# Patient Record
Sex: FEMALE | Race: WHITE | Employment: UNEMPLOYED | URBAN - NONMETROPOLITAN AREA
[De-identification: names, ages, dates, MRNs, and addresses within clinical notes are randomized per-mention and may not be internally consistent; named-entity substitution may affect disease eponyms.]

---

## 2018-01-30 ENCOUNTER — OFFICE VISIT (OUTPATIENT)
Dept: FAMILY MEDICINE CLINIC | Age: 53
End: 2018-01-30
Payer: COMMERCIAL

## 2018-01-30 VITALS
SYSTOLIC BLOOD PRESSURE: 120 MMHG | HEIGHT: 63 IN | WEIGHT: 153 LBS | BODY MASS INDEX: 27.11 KG/M2 | DIASTOLIC BLOOD PRESSURE: 70 MMHG | TEMPERATURE: 100.5 F

## 2018-01-30 DIAGNOSIS — J06.9 UPPER RESPIRATORY TRACT INFECTION, UNSPECIFIED TYPE: Primary | ICD-10-CM

## 2018-01-30 PROCEDURE — G8427 DOCREV CUR MEDS BY ELIG CLIN: HCPCS | Performed by: FAMILY MEDICINE

## 2018-01-30 PROCEDURE — 99213 OFFICE O/P EST LOW 20 MIN: CPT | Performed by: FAMILY MEDICINE

## 2018-01-30 PROCEDURE — 1036F TOBACCO NON-USER: CPT | Performed by: FAMILY MEDICINE

## 2018-01-30 PROCEDURE — G8419 CALC BMI OUT NRM PARAM NOF/U: HCPCS | Performed by: FAMILY MEDICINE

## 2018-01-30 PROCEDURE — 3014F SCREEN MAMMO DOC REV: CPT | Performed by: FAMILY MEDICINE

## 2018-01-30 PROCEDURE — G8484 FLU IMMUNIZE NO ADMIN: HCPCS | Performed by: FAMILY MEDICINE

## 2018-01-30 PROCEDURE — 3017F COLORECTAL CA SCREEN DOC REV: CPT | Performed by: FAMILY MEDICINE

## 2018-01-30 RX ORDER — PROMETHAZINE HYDROCHLORIDE AND CODEINE PHOSPHATE 6.25; 1 MG/5ML; MG/5ML
SYRUP ORAL
Qty: 100 ML | Refills: 0 | Status: SHIPPED | OUTPATIENT
Start: 2018-01-30 | End: 2018-02-09

## 2018-01-30 RX ORDER — AZITHROMYCIN 250 MG/1
TABLET, FILM COATED ORAL
Qty: 1 PACKET | Refills: 0 | Status: SHIPPED | OUTPATIENT
Start: 2018-01-30 | End: 2018-02-09

## 2018-01-30 ASSESSMENT — PATIENT HEALTH QUESTIONNAIRE - PHQ9
SUM OF ALL RESPONSES TO PHQ9 QUESTIONS 1 & 2: 0
1. LITTLE INTEREST OR PLEASURE IN DOING THINGS: 0
2. FEELING DOWN, DEPRESSED OR HOPELESS: 0
SUM OF ALL RESPONSES TO PHQ QUESTIONS 1-9: 0

## 2018-01-30 NOTE — PROGRESS NOTES
[address]  [place] [id number]  Dept: [phone]      Venita Gilbert is a 46 y.o. female who presents today for   Chief Complaint   Patient presents with    Pharyngitis    Cough       HPI  Respiratory Symptoms:  Venita Gilbert complains of 1 day(s) history of sore throat, congestion, post nasal drip, coryza, non productive cough, chills, headache and ear pain Pt denies fever, shortness of breath and wheezing/chest tightness. Smoking history:  She  reports that she has never smoked. She has never used smokeless tobacco. Treatment to date: advil, nyquil. Current Outpatient Prescriptions   Medication Sig Dispense Refill    azithromycin (ZITHROMAX) 250 MG tablet Take 2 tabs (500 mg) on Day 1, and take 1 tab (250 mg) on days 2 through 5. 1 packet 0    promethazine-codeine (PHENERGAN WITH CODEINE) 6.25-10 MG/5ML syrup 1-2 tsp po q6h prn. 100 mL 0     No current facility-administered medications for this visit. ROS:  General Constitutional: Admits chills. Denies fever. Admits headache. Denies lightheadedness. Ophthalmologic: Denies blurred vision. ENT: Admits nasal congestion post nasal drip, admits sore throat. Admits ear pain and pressure. Respiratory: Admits dry cough, worse at night. Denies shortness of breath. Denies wheezing. Past Surgical History:   Procedure Laterality Date     SECTION         No family history on file. No past medical history on file. Social History   Substance Use Topics    Smoking status: Never Smoker    Smokeless tobacco: Never Used    Alcohol use Not on file      Current Outpatient Prescriptions   Medication Sig Dispense Refill    azithromycin (ZITHROMAX) 250 MG tablet Take 2 tabs (500 mg) on Day 1, and take 1 tab (250 mg) on days 2 through 5. 1 packet 0    promethazine-codeine (PHENERGAN WITH CODEINE) 6.25-10 MG/5ML syrup 1-2 tsp po q6h prn.  100 mL 0     No current

## 2019-11-07 ENCOUNTER — HOSPITAL ENCOUNTER (OUTPATIENT)
Dept: WOMENS IMAGING | Age: 54
Discharge: HOME | End: 2019-11-09
Payer: COMMERCIAL

## 2019-11-07 DIAGNOSIS — Z12.31 BREAST CANCER SCREENING BY MAMMOGRAM: ICD-10-CM

## 2019-11-07 PROCEDURE — 77063 BREAST TOMOSYNTHESIS BI: CPT

## 2020-07-27 ENCOUNTER — OFFICE VISIT (OUTPATIENT)
Dept: FAMILY MEDICINE CLINIC | Age: 55
End: 2020-07-27
Payer: COMMERCIAL

## 2020-07-27 ENCOUNTER — TELEPHONE (OUTPATIENT)
Dept: GASTROENTEROLOGY | Facility: CLINIC | Age: 55
End: 2020-07-27

## 2020-07-27 VITALS
DIASTOLIC BLOOD PRESSURE: 80 MMHG | HEIGHT: 62 IN | WEIGHT: 149.6 LBS | BODY MASS INDEX: 27.53 KG/M2 | SYSTOLIC BLOOD PRESSURE: 112 MMHG | HEART RATE: 68 BPM

## 2020-07-27 PROCEDURE — 99396 PREV VISIT EST AGE 40-64: CPT | Performed by: NURSE PRACTITIONER

## 2020-07-27 ASSESSMENT — ENCOUNTER SYMPTOMS
DIARRHEA: 0
CONSTIPATION: 0

## 2020-07-27 NOTE — PROGRESS NOTES
Name: Radha Hernandez  : 1965         Chief Complaint:     Chief Complaint   Patient presents with    Annual Exam       History of Present Illness:      Radha Hernandez is a 54 y.o.  female who presents with Annual Exam      HPI     The patient presents for an annual exam. She has no concerns today and states that she is doing well. She states that she has a history of loose stools which have since been resolved by taking probiotics and by dietary modification. She has decreased the size of her portions, decreased the amount of fried foods, such as onion rings, and has eaten more salads which have all helped to improve her symptoms. She reports that she used to use [employer] in the past, and reports that she is aware that she needs to eat healthier. For exercise, she walks 2 miles, three times per week and participates in housework and yardwork. Past Medical History:     History reviewed. No pertinent past medical history. Reviewed all health maintenance requirements and ordered appropriate tests  Health Maintenance Due   Topic Date Due    Hepatitis C screen  1965    HIV screen  1980    DTaP/Tdap/Td vaccine (1 - Tdap) 1984    Cervical cancer screen  1986    Lipid screen  2005    Diabetes screen  2005    Shingles Vaccine (1 of 2) 2015    Colon cancer screen colonoscopy  2015       Past Surgical History:     Past Surgical History:   Procedure Laterality Date     SECTION          Medications:       Prior to Admission medications    Medication Sig Start Date End Date Taking? Authorizing Provider   BIOTIN PO Take by mouth daily   Yes Historical Provider, MD   CRANBERRY PO Take by mouth   Yes Historical Provider, MD   Probiotic Product (PROBIOTIC PO) Take by mouth   Yes Historical Provider, MD        Allergies:       Patient has no known allergies. Social History:     Tobacco:    reports that she has never smoked.  She has never used smokeless tobacco.  Alcohol:      reports current alcohol use. Drug Use:  reports no history of drug use. Family History:     History reviewed. No pertinent family history. Review of Systems:     Positive and Negative as described in HPI    Review of Systems   Gastrointestinal: Negative for constipation and diarrhea. Physical Exam:   Vitals:  /80 (Site: Left Upper Arm, Position: Sitting)   Pulse 68   Ht 5' 2.25\" (1.581 m)   Wt 149 lb 9.6 oz (67.9 kg)   BMI 27.14 kg/m²     Physical Exam  Constitutional:       Appearance: Normal appearance. HENT:      Head: Normocephalic. Right Ear: Tympanic membrane, ear canal and external ear normal.      Left Ear: Tympanic membrane, ear canal and external ear normal.      Nose: Nose normal. No congestion or rhinorrhea. Mouth/Throat:      Mouth: Mucous membranes are moist.      Pharynx: No oropharyngeal exudate or posterior oropharyngeal erythema. Eyes:      Extraocular Movements: Extraocular movements intact. Conjunctiva/sclera: Conjunctivae normal.      Pupils: Pupils are equal, round, and reactive to light. Neck:      Musculoskeletal: Neck supple. No neck rigidity. Cardiovascular:      Rate and Rhythm: Normal rate and regular rhythm. Pulses: Normal pulses. Heart sounds: No murmur. Pulmonary:      Effort: Pulmonary effort is normal. No respiratory distress. Breath sounds: Normal breath sounds. No wheezing. Abdominal:      General: Abdomen is flat. Bowel sounds are normal. There is no distension. Palpations: Abdomen is soft. There is no mass. Musculoskeletal:      Comments: 5/5 strength upper and lower extremities    Skin:     General: Skin is warm and dry. Neurological:      Mental Status: She is alert and oriented to person, place, and time. Psychiatric:         Mood and Affect: Mood normal.         Behavior: Behavior normal.         Thought Content:  Thought content normal. Judgment: Judgment normal.         Data:     No results found for: NA, K, CL, CO2, BUN, CREATININE, GLUCOSE, PROT, LABALBU, BILITOT, ALKPHOS, AST, ALT  No results found for: WBC, RBC, HGB, HCT, MCV, MCH, MCHC, RDW, PLT, MPV  No results found for: TSH  No results found for: CHOL, HDL, PSA, LABA1C    Assessment/Plan:      Diagnosis Orders   1. Routine general medical examination at health care facility  Comprehensive Metabolic Panel    CBC With Auto Differential    Hemoglobin A1C    Lipid Panel     Wellness Exam:   - Health counseling was provided on proper diet and exercise. Patient's BMI was discussed. - Patient is due for her shingles and tdap vaccine. She was encouraged to complete these at the health department. - Patient is due for HIV screen, she denies at this time. - Discussed the difference between the various screening modalities for colorectal cancer. Patient opted for a colonoscopy and was referred to Dr. Jean Marie Mcguire today. - Patient is due for her cervical cancer screen. She will follow up with gynecology for pap. - Mammogram UTD, normal findings 11/2019.   - Patient is due for lipid and diabetes screening. She will complete labs prior to her next appointment. Completed Refills   Requested Prescriptions      No prescriptions requested or ordered in this encounter       Orders Placed This Encounter   Procedures    Comprehensive Metabolic Panel     Standing Status:   Future     Standing Expiration Date:   7/27/2021    CBC With Auto Differential     Standing Status:   Future     Standing Expiration Date:   7/27/2021    Hemoglobin A1C     Standing Status:   Future     Standing Expiration Date:   7/27/2021    Lipid Panel     Standing Status:   Future     Standing Expiration Date:   7/27/2021     Order Specific Question:   Is Patient Fasting?/# of Hours     Answer:   fasting        No results found for this visit on 07/27/20.     Return in about 6 months (around 1/27/2021), or if symptoms worsen or fail to improve.     Electronically signed by ANIVAL Huertas CNP on 07/27/20 at 5:18 PM.

## 2020-07-27 NOTE — TELEPHONE ENCOUNTER
Direct Endoscopy Referral       Fax to [phone] or call the [facility]ier [address] at [address] [facility]  1965  [phone] There is no work phone number on file. [address]    Referring Provider: Katt Mcgee MD   [address] [facility] [address] [phone]  Special Requests: PLEASE CALL AND SCHEDULE PATIENT    Check Requested Procedures:    [x]  Colonoscopy (Please check test type and diagnosis below)    [x]  CPT Code:  Screening Average Risk     []  CPT Code:  Screening High Risk                 []  CPT Code :  Diagnostic Colonoscopy 20240    []  Personal History of colon cancer (Date of surgery)               []  Personal History of colon polyps (Date of surgery)     []  Family history of colon cancer ([facility])     []  Family history of colon polyps (1st degree relative - Who)     []  Abnormal barium enema or CT (Please attach report)    []  Change in bowel habits     []  Occult GI bleeding     []  Melena with negative EGD     []  Iron deficiency anemia    []  Other:        []  EGD (Please check test type and diagnosis below)     []  CPT Code: EGD 66766    []  Melena     []  Dyspepsia/GERD     []  Dysphagia     []  Epigastric pain unresponsive to treatment     []  Iron deficiency anemia with negative colonoscopy     []  Occult GI bleeding with negative colonoscopy     []  Abnormal UGI, x-ray, or CT (attach report)    [] Other:    HISTORY:  No past medical history on file. Patient has no known allergies. Prior to Visit Medications    Medication Sig Taking?  Authorizing Provider   NONFORMULARY   Jany Palencia MD         Electronically signed by Katt Mcgee MD on 7/27/20 at 3:45 PM EDT

## 2020-07-27 NOTE — PATIENT INSTRUCTIONS
SURVEY:    You may be receiving a survey from [employer] regarding your visit today. Please complete the survey to enable us to provide the highest quality of care to you and your family. If you are unable to score a \"very good' on any question, please call the office to discuss how we can improve your experience. We appreciate your feedback. Thank you! Plan:   Wellness Exam:   - Health counseling was provided on proper diet and exercise. Patient's BMI was discussed. - Patient is due for her shingles and tdap vaccine. She was encouraged to complete these at the health department. - Patient is due for HIV screen, she denies at this time. - Discussed the difference between the various screening modalities for colorectal cancer. Patient opted for a colonoscopy and was referred to Dr. Lisandro Ugalde today. - Patient is due for her cervical cancer screen. She will follow up with gynecology for pap. - Mammogram UTD, normal findings 11/2019.   - Patient is due for lipid and diabetes screening. She will complete labs prior to her next appointment.

## 2020-07-31 ENCOUNTER — HOSPITAL ENCOUNTER (OUTPATIENT)
Dept: LAB | Age: 55
Setting detail: SPECIMEN
Discharge: HOME | End: 2020-07-31
Payer: COMMERCIAL

## 2020-07-31 ENCOUNTER — TELEPHONE (OUTPATIENT)
Dept: FAMILY MEDICINE CLINIC | Age: 55
End: 2020-07-31

## 2020-07-31 PROBLEM — Z12.11 COLON CANCER SCREENING: Status: ACTIVE | Noted: 2020-07-31

## 2020-07-31 PROCEDURE — U0003 INFECTIOUS AGENT DETECTION BY NUCLEIC ACID (DNA OR RNA); SEVERE ACUTE RESPIRATORY SYNDROME CORONAVIRUS 2 (SARS-COV-2) (CORONAVIRUS DISEASE [COVID-19]), AMPLIFIED PROBE TECHNIQUE, MAKING USE OF HIGH THROUGHPUT TECHNOLOGIES AS DESCRIBED BY CMS-2020-01-R: HCPCS

## 2020-07-31 RX ORDER — SODIUM, POTASSIUM,MAG SULFATES 17.5-3.13G
SOLUTION, RECONSTITUTED, ORAL ORAL
Qty: 2 BOTTLE | Refills: 0 | Status: SHIPPED | OUTPATIENT
Start: 2020-07-31 | End: 2020-11-10 | Stop reason: ALTCHOICE

## 2020-08-03 LAB — SARS-COV-2, NAA: NOT DETECTED

## 2020-08-06 ENCOUNTER — HOSPITAL ENCOUNTER (OUTPATIENT)
Age: 55
Setting detail: SPECIMEN
Discharge: HOME | End: 2020-08-06
Payer: COMMERCIAL

## 2020-08-06 LAB
ABSOLUTE EOS #: 0.09 K/UL (ref 0–0.44)
ABSOLUTE IMMATURE GRANULOCYTE: <0.03 K/UL (ref 0–0.3)
ABSOLUTE LYMPH #: 1.35 K/UL (ref 1.1–3.7)
ABSOLUTE MONO #: 0.45 K/UL (ref 0.1–1.2)
ALBUMIN SERPL-MCNC: 3.9 G/DL (ref 3.5–5.2)
ALBUMIN/GLOBULIN RATIO: 1.4 (ref 1–2.5)
ALP BLD-CCNC: 68 U/L (ref 35–104)
ALT SERPL-CCNC: 19 U/L (ref 5–33)
ANION GAP SERPL CALCULATED.3IONS-SCNC: 13 MMOL/L (ref 9–17)
AST SERPL-CCNC: 27 U/L
BASOPHILS # BLD: 1 % (ref 0–2)
BASOPHILS ABSOLUTE: 0.03 K/UL (ref 0–0.2)
BILIRUB SERPL-MCNC: 0.28 MG/DL (ref 0.3–1.2)
BUN BLDV-MCNC: 11 MG/DL (ref 6–20)
BUN/CREAT BLD: ABNORMAL (ref 9–20)
CALCIUM SERPL-MCNC: 9.4 MG/DL (ref 8.6–10.4)
CHLORIDE BLD-SCNC: 102 MMOL/L (ref 98–107)
CHOLESTEROL, FASTING: 244 MG/DL
CHOLESTEROL/HDL RATIO: 3.3
CO2: 24 MMOL/L (ref 20–31)
CREAT SERPL-MCNC: 0.77 MG/DL (ref 0.5–0.9)
DIFFERENTIAL TYPE: NORMAL
EOSINOPHILS RELATIVE PERCENT: 2 % (ref 1–4)
GFR AFRICAN AMERICAN: >60 ML/MIN
GFR NON-AFRICAN AMERICAN: >60 ML/MIN
GFR SERPL CREATININE-BSD FRML MDRD: ABNORMAL ML/MIN/{1.73_M2}
GFR SERPL CREATININE-BSD FRML MDRD: ABNORMAL ML/MIN/{1.73_M2}
GLUCOSE BLD-MCNC: 72 MG/DL (ref 70–99)
HCT VFR BLD CALC: 46.8 % (ref 36.3–47.1)
HDLC SERPL-MCNC: 74 MG/DL
HEMOGLOBIN: 14.5 G/DL (ref 11.9–15.1)
IMMATURE GRANULOCYTES: 0 %
LDL CHOLESTEROL: 152 MG/DL (ref 0–130)
LYMPHOCYTES # BLD: 27 % (ref 24–43)
MCH RBC QN AUTO: 28.4 PG (ref 25.2–33.5)
MCHC RBC AUTO-ENTMCNC: 31 G/DL (ref 28.4–34.8)
MCV RBC AUTO: 91.6 FL (ref 82.6–102.9)
MONOCYTES # BLD: 9 % (ref 3–12)
NRBC AUTOMATED: 0 PER 100 WBC
PDW BLD-RTO: 13.6 % (ref 11.8–14.4)
PLATELET # BLD: 378 K/UL (ref 138–453)
PLATELET ESTIMATE: NORMAL
PMV BLD AUTO: 12.1 FL (ref 8.1–13.5)
POTASSIUM SERPL-SCNC: 4.4 MMOL/L (ref 3.7–5.3)
RBC # BLD: 5.11 M/UL (ref 3.95–5.11)
RBC # BLD: NORMAL 10*6/UL
SEG NEUTROPHILS: 61 % (ref 36–65)
SEGMENTED NEUTROPHILS ABSOLUTE COUNT: 2.98 K/UL (ref 1.5–8.1)
SODIUM BLD-SCNC: 139 MMOL/L (ref 135–144)
TOTAL PROTEIN: 6.7 G/DL (ref 6.4–8.3)
TRIGLYCERIDE, FASTING: 89 MG/DL
VLDLC SERPL CALC-MCNC: ABNORMAL MG/DL (ref 1–30)
WBC # BLD: 4.9 K/UL (ref 3.5–11.3)
WBC # BLD: NORMAL 10*3/UL

## 2020-08-07 LAB
ESTIMATED AVERAGE GLUCOSE: 120 MG/DL
HBA1C MFR BLD: 5.8 % (ref 4–6)

## 2020-08-30 PROBLEM — Z12.11 COLON CANCER SCREENING: Status: RESOLVED | Noted: 2020-07-31 | Resolved: 2020-08-30

## 2020-09-17 ENCOUNTER — TELEPHONE (OUTPATIENT)
Dept: SURGERY | Age: 55
End: 2020-09-17

## 2020-09-17 NOTE — TELEPHONE ENCOUNTER
Jami Rose called this office stating she would like to schedule a screening colonoscopy. Surgical papers given to Dr. Leonila Chan and Screening colonoscopy was approved and scheduled for 10-. Instruction reviewed and copy mailed to Jami Rose.

## 2020-09-17 NOTE — TELEPHONE ENCOUNTER
Patient notified procedure is cancelled due to Dr. Kody Fiore retiring. Patient would like to follow up with PCP rather than have information sent to general surgery office.

## 2020-10-23 ENCOUNTER — HOSPITAL ENCOUNTER (OUTPATIENT)
Dept: PREADMISSION TESTING | Age: 55
Discharge: HOME | End: 2020-10-27
Payer: COMMERCIAL

## 2020-10-23 LAB
SARS-COV-2, RAPID: NORMAL
SARS-COV-2: NORMAL
SARS-COV-2: NOT DETECTED
SOURCE: NORMAL

## 2020-10-23 PROCEDURE — C9803 HOPD COVID-19 SPEC COLLECT: HCPCS

## 2020-10-23 PROCEDURE — U0003 INFECTIOUS AGENT DETECTION BY NUCLEIC ACID (DNA OR RNA); SEVERE ACUTE RESPIRATORY SYNDROME CORONAVIRUS 2 (SARS-COV-2) (CORONAVIRUS DISEASE [COVID-19]), AMPLIFIED PROBE TECHNIQUE, MAKING USE OF HIGH THROUGHPUT TECHNOLOGIES AS DESCRIBED BY CMS-2020-01-R: HCPCS

## 2020-10-29 ENCOUNTER — ANESTHESIA EVENT (OUTPATIENT)
Dept: OPERATING ROOM | Age: 55
End: 2020-10-29
Payer: COMMERCIAL

## 2020-10-30 ENCOUNTER — HOSPITAL ENCOUNTER (OUTPATIENT)
Age: 55
Setting detail: OUTPATIENT SURGERY
Discharge: HOME | End: 2020-10-30
Attending: SURGERY | Admitting: SURGERY
Payer: COMMERCIAL

## 2020-10-30 ENCOUNTER — ANESTHESIA (OUTPATIENT)
Dept: OPERATING ROOM | Age: 55
End: 2020-10-30
Payer: COMMERCIAL

## 2020-10-30 VITALS
RESPIRATION RATE: 18 BRPM | OXYGEN SATURATION: 98 % | DIASTOLIC BLOOD PRESSURE: 54 MMHG | TEMPERATURE: 97.6 F | SYSTOLIC BLOOD PRESSURE: 134 MMHG | HEART RATE: 64 BPM | HEIGHT: 63 IN | BODY MASS INDEX: 25.69 KG/M2 | WEIGHT: 145 LBS

## 2020-10-30 VITALS
SYSTOLIC BLOOD PRESSURE: 108 MMHG | DIASTOLIC BLOOD PRESSURE: 59 MMHG | RESPIRATION RATE: 17 BRPM | OXYGEN SATURATION: 99 %

## 2020-10-30 PROCEDURE — 7100000011 HC PHASE II RECOVERY - ADDTL 15 MIN: Performed by: SURGERY

## 2020-10-30 PROCEDURE — 7100000010 HC PHASE II RECOVERY - FIRST 15 MIN: Performed by: SURGERY

## 2020-10-30 PROCEDURE — 2709999900 HC NON-CHARGEABLE SUPPLY: Performed by: SURGERY

## 2020-10-30 PROCEDURE — 2500000003 HC RX 250 WO HCPCS: Performed by: NURSE ANESTHETIST, CERTIFIED REGISTERED

## 2020-10-30 PROCEDURE — 88305 TISSUE EXAM BY PATHOLOGIST: CPT

## 2020-10-30 PROCEDURE — 3700000000 HC ANESTHESIA ATTENDED CARE: Performed by: SURGERY

## 2020-10-30 PROCEDURE — 3700000001 HC ADD 15 MINUTES (ANESTHESIA): Performed by: SURGERY

## 2020-10-30 PROCEDURE — 2580000003 HC RX 258: Performed by: SURGERY

## 2020-10-30 PROCEDURE — 6360000002 HC RX W HCPCS: Performed by: NURSE ANESTHETIST, CERTIFIED REGISTERED

## 2020-10-30 PROCEDURE — 3609010600 HC COLONOSCOPY POLYPECTOMY SNARE/COLD BIOPSY: Performed by: SURGERY

## 2020-10-30 PROCEDURE — 45385 COLONOSCOPY W/LESION REMOVAL: CPT | Performed by: SURGERY

## 2020-10-30 RX ORDER — LIDOCAINE HYDROCHLORIDE 20 MG/ML
INJECTION, SOLUTION EPIDURAL; INFILTRATION; INTRACAUDAL; PERINEURAL PRN
Status: DISCONTINUED | OUTPATIENT
Start: 2020-10-30 | End: 2020-10-30 | Stop reason: SDUPTHER

## 2020-10-30 RX ORDER — PROPOFOL 10 MG/ML
INJECTION, EMULSION INTRAVENOUS CONTINUOUS PRN
Status: DISCONTINUED | OUTPATIENT
Start: 2020-10-30 | End: 2020-10-30 | Stop reason: SDUPTHER

## 2020-10-30 RX ORDER — PROPOFOL 10 MG/ML
INJECTION, EMULSION INTRAVENOUS PRN
Status: DISCONTINUED | OUTPATIENT
Start: 2020-10-30 | End: 2020-10-30 | Stop reason: SDUPTHER

## 2020-10-30 RX ORDER — SODIUM CHLORIDE, SODIUM LACTATE, POTASSIUM CHLORIDE, CALCIUM CHLORIDE 600; 310; 30; 20 MG/100ML; MG/100ML; MG/100ML; MG/100ML
INJECTION, SOLUTION INTRAVENOUS CONTINUOUS
Status: DISCONTINUED | OUTPATIENT
Start: 2020-10-30 | End: 2020-10-30 | Stop reason: HOSPADM

## 2020-10-30 RX ADMIN — PROPOFOL 40 MG: 10 INJECTION, EMULSION INTRAVENOUS at 10:45

## 2020-10-30 RX ADMIN — SODIUM CHLORIDE, POTASSIUM CHLORIDE, SODIUM LACTATE AND CALCIUM CHLORIDE: 600; 310; 30; 20 INJECTION, SOLUTION INTRAVENOUS at 09:57

## 2020-10-30 RX ADMIN — LIDOCAINE HYDROCHLORIDE 80 MG: 20 INJECTION, SOLUTION EPIDURAL; INFILTRATION; INTRACAUDAL; PERINEURAL at 10:27

## 2020-10-30 RX ADMIN — PROPOFOL 200 MCG/KG/MIN: 10 INJECTION, EMULSION INTRAVENOUS at 10:28

## 2020-10-30 RX ADMIN — PROPOFOL 100 MG: 10 INJECTION, EMULSION INTRAVENOUS at 10:27

## 2020-10-30 NOTE — PROGRESS NOTES
Discharge instructions given to pt and spouse. Both verbalize understanding,deny any questions and/or concerns. Pt denies wheel chair and ambulates off unit w/ steady gait and all belongings. Discharge Criteria    Inpatients must meet Criteria 1 through 7. All other patients are either YES or N/A. If a NO is chosen then Anesthesia or Surgeon must be notified. 1.  Minimum 30 minutes after last dose of sedative medication, minimum 120 minutes after last dose of reversal agent. Yes      2. Systolic BP stable within 20 mmHg for 30 minutes & systolic BP between 90 & 049 or within 10 mmHg of baseline. Yes      3. Pulse between 60 and 100 or within 10 bpm of baseline. Yes      4. Spontaneous respiratory rate >/= 10 per minute. Yes      5. SaO2 >/= 95 or  >/= baseline. Yes      6. Able to cough and swallow or return to baseline function. Yes      7. Alert and oriented or return to baseline mental status. Yes      8. Demonstrates controlled, coordinated movements, ambulates with steady gait, or return to baseline activity function. Yes      9. Minimal or no pain or nausea, or at a level tolerable and acceptable to patient. Yes      10. Takes and retains oral fluids as allowed. Yes      11. Procedural / perioperative site stable. Minimal or no bleeding. Yes          12. If GI endoscopy procedure, minimal or no abdominal distention or passing flatus. Yes      13. Written discharge instructions and emergency telephone number provided. Yes      14. Accompanied by a responsible adult.     Yes

## 2020-10-30 NOTE — OP NOTE
Operative Note      Patient: Rosa Muse  YOB: 1965  MRN: [id number]   Ashia Brandon MD        Date of Procedure: 10/30/2020     Pre-Op Diagnosis: screening colonoscopy     Post-Op Diagnosis: Same       Procedure(s):  Colonoscopy to cecum with cold snare polypectomy sigmoid polyp x 1     Surgeon(s):  Lilia Tavares DO     Assistant:  * No surgical staff found *     Anesthesia: Monitor Anesthesia Care     Estimated Blood Loss (mL): less than 1 ml     Complications: None     Specimens: sigmoid polyp    Procedure details:    I do meet with the patient in preoperative holding area. Please see H&P for indications for the above named procedure. Patient was brought to the endoscopy suite and placed under monitored anesthesia. Patient was positioned in left lateral position. Time out called and procedure confirmed. The lubricated variable stiffness pediatric colonoscope was carefully passed under direct vision into the rectum, advanced through the sigmoid colon, transverse colon, ascending colon and the cecum. The ileocecal valve was well visualized. Additional findings:    Findings:     Cecum: normal cecal pouch. IC valve and appendiceal orifice well confirmed  Ascending: normal  Transverse: normal  Descending: normal  Sigmoid: sessile polyp removed via cold snare polypectomy  Rectum: normal  Rectal exam: negative  Bowel prep quality: excellent    At the distal 1/3 of the rectum, the scope was retroflexed and was negative. The patient tolerated the procedure well. The abdomen was soft after the procedure. I spoke with pt/family afterwards. Next colonoscopy pending polyp pathology report.  Will call patient once results sent to me in Epic.               Electronically signed by Lilia Tavares DO, FACOS, FACS on 10/30/2020 at 10:14 AM

## 2020-10-30 NOTE — ANESTHESIA POSTPROCEDURE EVALUATION
Department of Anesthesiology  Postprocedure Note    Patient: Daryle Pearson  MRN: [id number]  [place]: 1965  Date of evaluation: 10/30/2020  Time:  1:06 PM     Procedure Summary     Date:  10/30/20 Room / Location:  [address] [place] [address]    Anesthesia Start:  1024 Anesthesia Stop:  1309    Procedure:  colonoscopy with polypectomy (N/A ) Diagnosis:  (SCREENING)    Surgeon:  Gio Huff DO Responsible Provider:  ANIVAL Leach CRNA    Anesthesia Type:  general ASA Status:  1          Anesthesia Type: general    Kathleen Phase I: Kathleen Score: 9    Kathleen Phase II: Kathleen Score: 10    Last vitals: Reviewed and per EMR flowsheets.        Anesthesia Post Evaluation    Patient location during evaluation: PACU  Patient participation: complete - patient participated  Level of consciousness: awake and alert  Pain score: 0  Airway patency: patent  Nausea & Vomiting: no nausea and no vomiting  Complications: no  Cardiovascular status: blood pressure returned to baseline  Respiratory status: acceptable and room air  Hydration status: stable

## 2020-10-30 NOTE — H&P
GENERAL SURGERY CONSULTATION / OFFICE VISIT      Patient's Name/ Date of Birth/ Gender: Jaime Cabrera / 1965 (54 y.o.) / female     PCP: Alex Jacome MD  Referring:     History of present Illness:  Patient is a pleasant 54 y.o. female  kindly referred by Alex Jacome MD  Direct referral screening colonoscopy. Denies rectal bleeding. No changes in bowel habits. No first degree relatives with colorectal cancer or inflammatory bowel disease. Denies weight loss or abdominal pain. Denies nausea or vomiting. Past Medical History:  has no past medical history on file. Past Surgical History:   Past Surgical History:   Procedure Laterality Date   Dylan       Social History:  reports that she has never smoked. She has never used smokeless tobacco. She reports current alcohol use. She reports that she does not use drugs. Family History: family history is not on file. Review of Systems:   General: Denies fever, chills, night sweats, weight loss, malaise, fatigue  HEENT: Denies sore throat, sinus problems, allergic rhinosinusitis  Card: Denies chest pain, palpitations, orthopnea/PND. HTN. Pulm: Denies cough, shortness of breath, dyspnea on exertion  GI:  per HPI. : Denies polyuria, dysuria, hematuria  Endo: neg  Heme: neg  Psych: neg  Neuro: Denies h/o CVA, TIA  Skin: Denies rashes, ulcers  Musculoskeletal: no gross motor dysfunction. OA    Allergies: Patient has no known allergies. Current Meds:  Current Facility-Administered Medications:     lactated ringers infusion, , Intravenous, Continuous, Lakshmi I Alizami, DO, Last Rate: 100 mL/hr at 10/30/20 0957    Physical Exam:  Vital signs and Nurse's note reviewed  Gen:  A&Ox3, NAD. Pleasant and cooperative.   HEENT: PERRLA, EOMI, no scleral icterus  Neck:  no goiter  CVS: Regular rate and rhythm  Resp: Good bilateral air entry, no active wheezing, no labored breathing  Abd: soft, non-tender, non-distended, bowel sounds present. Ext: Moves all extremities, no gross focal motor deficits  Skin: No erythema or ulcerations     Labs:   Lab Results   Component Value Date    WBC 4.9 08/06/2020    HGB 14.5 08/06/2020    HCT 46.8 08/06/2020    MCV 91.6 08/06/2020     08/06/2020     Lab Results   Component Value Date     08/06/2020    K 4.4 08/06/2020     08/06/2020    CO2 24 08/06/2020    BUN 11 08/06/2020    CREATININE 0.77 08/06/2020    GLUCOSE 72 08/06/2020    CALCIUM 9.4 08/06/2020     Lab Results   Component Value Date    ALKPHOS 68 08/06/2020    ALT 19 08/06/2020    AST 27 08/06/2020    PROT 6.7 08/06/2020    BILITOT 0.28 08/06/2020    LABALBU 3.9 08/06/2020       Impressions/Recommendations:     Screening colonoscopy (direct referral)    Risks and benefits of colonoscopy have been discussed in detail with the patient. Risks of the procedure include bleeding, bowel perforation, possibly missing smaller lesions if the bowel prep is not adequate. Alternative studies include barium enema and virtual colonoscopy; however, if a lesion is seen, then one would still need to proceed with the gold standard colonoscopy to retrieve or biopsy the lesion. All the patient's questions are answered. Will proceed with colonoscopy under MAC. H&P  General Surgery        Pt Name: Caryn Bo  MRN: [id number]  YOB: 1965  Date of evaluation: 10/30/2020      [x] I have examined the patient and reviewed the H&P/Consult completed, and there are no changes to the patient or plans. [] I have examined the patient and reviewed the H&P/Consult and have noted the following changes: The patient was counseled at length about the risks of teodora Covid-19 during their perioperative period and any recovery window from their procedure. The patient was made aware that teodora Covid-19  may worsen their prognosis for recovering from their procedure  and lend to a higher morbidity and/or mortality risk.   All material risks, benefits, and reasonable alternatives including postponing the procedure were discussed. The patient does wish to proceed with the procedure at this time.         Electronically signed by Lilia Tavares DO  on 10/30/2020 at 10:14 AM

## 2020-10-30 NOTE — ANESTHESIA PRE PROCEDURE
Department of Anesthesiology  Preprocedure Note       Name:  Jaime Cabrera   Age:  54 y.o.  :  1965                                          MRN:  [id number]         Date:  10/30/2020      Surgeon: Adrián Lombardo):  Lester Mazariegos DO    Procedure: Procedure(s):  COLORECTAL CANCER SCREENING, NOT HIGH RISK    Medications prior to admission:   Prior to Admission medications    Medication Sig Start Date End Date Taking? Authorizing Provider   Na Sulfate-K Sulfate-Mg Sulf (SUPREP BOWEL PREP KIT) 17.5-3.13-1.6 GM/177ML SOLN Take as directed 20  Yes Wojciech Claros MD   BIOTIN PO Take by mouth daily   Yes Historical Provider, MD   CRANBERRY PO Take by mouth   Yes Historical Provider, MD   Probiotic Product (PROBIOTIC PO) Take by mouth    Historical Provider, MD       Current medications:    Current Facility-Administered Medications   Medication Dose Route Frequency Provider Last Rate Last Dose    lactated ringers infusion   Intravenous Continuous Lakshmi I Nazemi,  mL/hr at 10/30/20 0957         Allergies:  No Known Allergies    Problem List:    Patient Active Problem List   Diagnosis Code    Upper respiratory tract infection J06.9       Past Medical History:  History reviewed. No pertinent past medical history.     Past Surgical History:        Procedure Laterality Date     SECTION         Social History:    Social History     Tobacco Use    Smoking status: Never Smoker    Smokeless tobacco: Never Used   Substance Use Topics    Alcohol use: Yes     Comment: Occasionally, on the weekends                                 Counseling given: Not Answered      Vital Signs (Current):   Vitals:    10/30/20 0945   BP: (!) 157/81   Pulse: 86   Resp: 12   Temp: 36.7 °C (98 °F)   TempSrc: Temporal   SpO2: 97%   Weight: 145 lb (65.8 kg)   Height: 5' 3\" (1.6 m)                                              BP Readings from Last 3 Encounters:   10/30/20 (!) 157/81   20 112/80   18 120/70 and normal exam  breath sounds clear to auscultation                             Cardiovascular:Negative CV ROS  Exercise tolerance: good (>4 METS),           Rhythm: regular  Rate: normal           Beta Blocker:  Not on Beta Blocker         Neuro/Psych:   Negative Neuro/Psych ROS              GI/Hepatic/Renal:   (+) bowel prep,           Endo/Other: Negative Endo/Other ROS                    Abdominal:           Vascular: negative vascular ROS. Anesthesia Plan      general     ASA 1       Induction: intravenous. Anesthetic plan and risks discussed with patient. Plan discussed with CRNA.                   ANIVAL Junior - INEZ   10/30/2020

## 2020-11-03 LAB — SURGICAL PATHOLOGY REPORT: NORMAL

## 2020-11-05 ENCOUNTER — TELEPHONE (OUTPATIENT)
Dept: SURGERY | Age: 55
End: 2020-11-05

## 2020-11-05 NOTE — RESULT ENCOUNTER NOTE
Ric Davila or [place]- Please let Dorys Allan know polyp removed was benign but needs another colonoscopy 3 years. I updated the HM tab to reflect this.

## 2020-11-05 NOTE — TELEPHONE ENCOUNTER
Enedina Lloyd informed of results. Read Dr. Sandee Montelongo note word for word. Enedina Lloyd voiced understanding. Denies questions.

## 2020-11-10 ENCOUNTER — OFFICE VISIT (OUTPATIENT)
Dept: FAMILY MEDICINE CLINIC | Age: 55
End: 2020-11-10
Payer: COMMERCIAL

## 2020-11-10 VITALS
SYSTOLIC BLOOD PRESSURE: 126 MMHG | WEIGHT: 151 LBS | HEIGHT: 63 IN | BODY MASS INDEX: 26.75 KG/M2 | DIASTOLIC BLOOD PRESSURE: 78 MMHG

## 2020-11-10 LAB
BILIRUBIN, POC: NORMAL
BLOOD URINE, POC: NORMAL
CLARITY, POC: CLEAR
COLOR, POC: NORMAL
GLUCOSE URINE, POC: NORMAL
KETONES, POC: NORMAL
LEUKOCYTE EST, POC: NORMAL
NITRITE, POC: NORMAL
PH, POC: 5
PROTEIN, POC: NORMAL
SPECIFIC GRAVITY, POC: 1.02
UROBILINOGEN, POC: NORMAL

## 2020-11-10 PROCEDURE — G8484 FLU IMMUNIZE NO ADMIN: HCPCS | Performed by: FAMILY MEDICINE

## 2020-11-10 PROCEDURE — 1036F TOBACCO NON-USER: CPT | Performed by: FAMILY MEDICINE

## 2020-11-10 PROCEDURE — 81003 URINALYSIS AUTO W/O SCOPE: CPT | Performed by: FAMILY MEDICINE

## 2020-11-10 PROCEDURE — G8419 CALC BMI OUT NRM PARAM NOF/U: HCPCS | Performed by: FAMILY MEDICINE

## 2020-11-10 PROCEDURE — 3017F COLORECTAL CA SCREEN DOC REV: CPT | Performed by: FAMILY MEDICINE

## 2020-11-10 PROCEDURE — G8427 DOCREV CUR MEDS BY ELIG CLIN: HCPCS | Performed by: FAMILY MEDICINE

## 2020-11-10 PROCEDURE — 99213 OFFICE O/P EST LOW 20 MIN: CPT | Performed by: FAMILY MEDICINE

## 2020-11-10 ASSESSMENT — PATIENT HEALTH QUESTIONNAIRE - PHQ9
2. FEELING DOWN, DEPRESSED OR HOPELESS: 0
SUM OF ALL RESPONSES TO PHQ QUESTIONS 1-9: 0
SUM OF ALL RESPONSES TO PHQ QUESTIONS 1-9: 0
SUM OF ALL RESPONSES TO PHQ9 QUESTIONS 1 & 2: 0
SUM OF ALL RESPONSES TO PHQ QUESTIONS 1-9: 0
1. LITTLE INTEREST OR PLEASURE IN DOING THINGS: 0

## 2020-11-10 NOTE — PATIENT INSTRUCTIONS
She has not had a PAP in several years, has never had an abnormal PAP, menopause 2 years ago  We discussed her family history, her fathers lung CA and her mothers teratoma  I reviewed her last set of labs with her, her last A1C suggested pre diabetes. She is given The Glycemic Index Diet book in the office today  I will get an abdominal CT scan today as well as check a urine specimen  I recommend she get a PAP test as well    SURVEY:    You may be receiving a survey from [employer] regarding your visit today. Please complete the survey to enable us to provide the highest quality of care to you and your family. If you cannot score us a very good on any question, please call the office to discuss how we could of made your experience a very good one. Thank you.

## 2020-11-10 NOTE — PROGRESS NOTES
Rangel MONTEIRO, HOANG, am scribing for and in the presence of Dr. Keshawn Kumari. 11/10/20/9:30 am/JML      [address] [address]  [id number]  Dept: [phone]    HPI:  Abdominal bloating, LUQ pain, gets worse as the day goes on and the more time she spends on her feet  Seems worse after she eats, like a tight pulling sensation    Pt had a colonoscopy on 10/30/20 with Dr. Jackie Zayas, she removed a tubular adenoma and was told to repeat in 3 years   No histroy of alcohol abuse  No pap in past 21 yr  Menopausal for 2 years  No bleeding  Mother had what sounds like dermoid tumor in her 62s  This is of concern to pt      No current outpatient medications on file. No current facility-administered medications for this visit. ROS:  Admits abdominal pain  Admits bloating  Denies diarrhea/constipation    EXAM:  /78   Ht 5' 3\" (1.6 m)   Wt 151 lb (68.5 kg)   BMI 26.75 kg/m²   Wt Readings from Last 3 Encounters:   11/10/20 151 lb (68.5 kg)   10/30/20 145 lb (65.8 kg)   07/27/20 149 lb 9.6 oz (67.9 kg)     BP Readings from Last 3 Encounters:   11/10/20 126/78   10/30/20 (!) 108/59   10/30/20 (!) 134/54     General Appearance: in no acute distress, well developed, well nourished. Eyes: pupils equal, round reactive to light and accommodation. Ears: normal canal and TM's. Nose: nares patent, no lesions. Oral Cavity: mucosa moist.  Throat: clear. Neck/Thyroid: neck supple, full range of motion, no cervical lymphadenopathy, no thyromegaly or carotid bruits. Skin: warm and dry. No suspicious lesions. Heart: regular rate and rhythm. No murmurs. S1, S2 normal, no gallops. Lungs: clear to auscultation bilaterally. Abdomen: bowel sounds present, soft, nontender, nondistended, no masses or organomegaly. Musculoskeletal: normal, full range of motion in knees and hips, no swelling or tenderness. Extremities: no cyanosis or edema.   Peripheral Pulses: 2+ throughout, symetric. Neurologic: nonfocal, motor strength normal upper and lower extremities, sensory exam intact. Psych: normal affect, speech fluent. ASSESSMENT:   Diagnosis Orders   1. Left upper quadrant abdominal pain  CT ABDOMEN PELVIS W WO CONTRAST Additional Contrast? Oral    POCT Urinalysis No Micro (Auto)   2. Abdominal bloating           PLAN:  She has not had a PAP in several years, has never had an abnormal PAP, menopause 2 years ago  We discussed her family history, her fathers lung CA and her mothers teratoma  I reviewed her last set of labs with her, her last A1C suggested pre diabetes. She is given The Glycemic Index Diet book in the office today  I will get an abdominal CT scan today as well as check a urine specimen  I recommend she get a PAP test as well    Orders Placed This Encounter   Procedures    CT ABDOMEN PELVIS W WO CONTRAST Additional Contrast? Oral     Standing Status:   Future     Standing Expiration Date:   11/10/2021     Order Specific Question:   Additional Contrast?     Answer:   Oral    POCT Urinalysis No Micro (Auto)     No orders of the defined types were placed in this encounter. I, Dr. Ariana Herrera, personally performed the services described in this documentation as scribed by KRISTEN Banks in my presence, and is both accurate and complete.

## 2020-11-18 ENCOUNTER — TELEPHONE (OUTPATIENT)
Dept: FAMILY MEDICINE CLINIC | Age: 55
End: 2020-11-18

## 2020-11-18 ENCOUNTER — OFFICE VISIT (OUTPATIENT)
Dept: FAMILY MEDICINE CLINIC | Age: 55
End: 2020-11-18
Payer: COMMERCIAL

## 2020-11-18 ENCOUNTER — HOSPITAL ENCOUNTER (OUTPATIENT)
Dept: LAB | Age: 55
Discharge: HOME | End: 2020-11-18
Payer: COMMERCIAL

## 2020-11-18 ENCOUNTER — HOSPITAL ENCOUNTER (OUTPATIENT)
Dept: CT IMAGING | Age: 55
Discharge: HOME | End: 2020-11-20
Payer: COMMERCIAL

## 2020-11-18 VITALS
WEIGHT: 154 LBS | SYSTOLIC BLOOD PRESSURE: 118 MMHG | BODY MASS INDEX: 27.29 KG/M2 | HEIGHT: 63 IN | HEART RATE: 66 BPM | DIASTOLIC BLOOD PRESSURE: 80 MMHG

## 2020-11-18 LAB
ANION GAP SERPL CALCULATED.3IONS-SCNC: 9 MMOL/L (ref 9–17)
BUN BLDV-MCNC: 12 MG/DL (ref 6–20)
BUN/CREAT BLD: 16 (ref 9–20)
CALCIUM SERPL-MCNC: 8.8 MG/DL (ref 8.6–10.4)
CHLORIDE BLD-SCNC: 105 MMOL/L (ref 98–107)
CO2: 27 MMOL/L (ref 20–31)
CREAT SERPL-MCNC: 0.75 MG/DL (ref 0.5–0.9)
GFR AFRICAN AMERICAN: >60 ML/MIN
GFR NON-AFRICAN AMERICAN: >60 ML/MIN
GFR SERPL CREATININE-BSD FRML MDRD: ABNORMAL ML/MIN/{1.73_M2}
GFR SERPL CREATININE-BSD FRML MDRD: ABNORMAL ML/MIN/{1.73_M2}
GLUCOSE BLD-MCNC: 101 MG/DL (ref 70–99)
POTASSIUM SERPL-SCNC: 4.5 MMOL/L (ref 3.7–5.3)
SODIUM BLD-SCNC: 141 MMOL/L (ref 135–144)

## 2020-11-18 PROCEDURE — 74177 CT ABD & PELVIS W/CONTRAST: CPT

## 2020-11-18 PROCEDURE — 36415 COLL VENOUS BLD VENIPUNCTURE: CPT

## 2020-11-18 PROCEDURE — 80048 BASIC METABOLIC PNL TOTAL CA: CPT

## 2020-11-18 PROCEDURE — 99214 OFFICE O/P EST MOD 30 MIN: CPT | Performed by: NURSE PRACTITIONER

## 2020-11-18 PROCEDURE — 6360000004 HC RX CONTRAST MEDICATION: Performed by: NURSE PRACTITIONER

## 2020-11-18 RX ADMIN — IOPAMIDOL 75 ML: 755 INJECTION, SOLUTION INTRAVENOUS at 13:46

## 2020-11-18 RX ADMIN — IOPAMIDOL 18 ML: 755 INJECTION, SOLUTION INTRAVENOUS at 13:46

## 2020-11-18 ASSESSMENT — ENCOUNTER SYMPTOMS
WHEEZING: 0
CONSTIPATION: 1
ABDOMINAL PAIN: 0
DIARRHEA: 1
BLOOD IN STOOL: 0
VOMITING: 0
ABDOMINAL DISTENTION: 1
BACK PAIN: 1
SHORTNESS OF BREATH: 0
COUGH: 0
NAUSEA: 0

## 2020-11-18 NOTE — PROGRESS NOTES
Name: Frankie Barney  : 1965         Chief Complaint:     Chief Complaint   Patient presents with    Other     Patient comes in for abdominal bloating. Denies pain. Very gasy, bleching. Lose stool ( light in color). going multilpe times a day. had a colonoscopy 2.5 weeks ago. Abdomin very extended. History of Present Illness:      Frankie Barney is a 54 y.o.  female who presents with Other (Patient comes in for abdominal bloating. Denies pain. Very gasy, bleching. Lose stool ( light in color). going multilpe times a day. had a colonoscopy 2.5 weeks ago. Abdomin very extended. )      HPI  The patient presents today with complaints of abdominal bloating and LUQ \"pulling sensation\". She was seen in office on 11/10/2020 for symptoms of abdominal bloating and LUQ discomfort. Patient had a colonoscopy on 10/30/2020 with Dr. Simone Haddad who removed tubular adenoma and was instructed to repeat in 3 years. Denies pap within last 20 years but has pap smear scheduled for 20. Menopausal for 2 years, denies uterine bleeding. Mother with history of dermoid tumor in her 45s. She was ordered an abdomen pelvis CT on 11/10/20 but has not completed due to issues with insurance and scheduling. She denies hysterectomy. She admits allergy to abx she received during , name unknown, which caused abdominal bloating. Today, she admits LUQ pulling sensation and abdominal bloating. Overall, she states her abdominal bloating is worsening. Denies LUQ pain. She admits decreased appetite. Admits loose bowel movements, sometimes several times per day. Denies blood in stool. Admits possible constipation 2 weeks ago. Notes increased water intake. LUQ discomfort was initially present after eating but is now present with positional changes such as bending over. Past Medical History:     No past medical history on file.    Reviewed all health maintenance requirements and ordered appropriate tests  Health Maintenance Due   Topic Date Due    Hepatitis C screen  1965    HIV screen  1980    DTaP/Tdap/Td vaccine (1 - Tdap) 1984    Cervical cancer screen  1986    Shingles Vaccine (1 of 2) 2015    Flu vaccine (1) 2020       Past Surgical History:     Past Surgical History:   Procedure Laterality Date     SECTION      COLONOSCOPY N/A 10/30/2020    colonoscopy with polypectomy performed by Madiha Schultz DO at [address]        Medications:       Prior to Admission medications    Not on File        Allergies:       Patient has no known allergies. Social History:     Tobacco:    reports that she has never smoked. She has never used smokeless tobacco.  Alcohol:      reports current alcohol use. Drug Use:  reports no history of drug use. Family History:     Family History   Problem Relation Age of Onset    Cancer Father         lung       Review of Systems:     Positive and Negative as described in HPI    Review of Systems   Constitutional: Negative for chills and fever. Respiratory: Negative for cough, shortness of breath and wheezing. Cardiovascular: Negative for chest pain and palpitations. Gastrointestinal: Positive for abdominal distention, constipation and diarrhea. Negative for abdominal pain, blood in stool, nausea and vomiting. Genitourinary: Negative for difficulty urinating, dysuria, frequency, hematuria, urgency, vaginal bleeding, vaginal discharge and vaginal pain. Occasional RLQ abdominal \"glitch\" sensation. Musculoskeletal: Positive for back pain. Neurological: Negative for dizziness and light-headedness. Physical Exam:   Vitals:  /80   Pulse 66   Ht 5' 3\" (1.6 m)   Wt 154 lb (69.9 kg)   BMI 27.28 kg/m²     Physical Exam  Constitutional:       General: She is not in acute distress. Appearance: Normal appearance. She is normal weight. She is not ill-appearing, toxic-appearing or diaphoretic.    HENT:      Head: Normocephalic. Cardiovascular:      Rate and Rhythm: Normal rate and regular rhythm. Heart sounds: Normal heart sounds. No murmur. Pulmonary:      Effort: Pulmonary effort is normal. No respiratory distress. Breath sounds: Normal breath sounds. No stridor. No wheezing, rhonchi or rales. Abdominal:      Comments: Abdomen greatly distended. No guarding, tenderness, rebound tenderness in LLQ or RLQ. Negative Dunne's. No palpable mass or hernia. Bowel sounds present all 4 quadrants. No abdominal fluid wave. Skin:     General: Skin is warm. Neurological:      Mental Status: She is alert and oriented to person, place, and time. Psychiatric:         Mood and Affect: Mood normal.         Behavior: Behavior normal.         Thought Content: Thought content normal.         Judgment: Judgment normal.         Data:     Lab Results   Component Value Date     08/06/2020    K 4.4 08/06/2020     08/06/2020    CO2 24 08/06/2020    BUN 11 08/06/2020    CREATININE 0.77 08/06/2020    GLUCOSE 72 08/06/2020    PROT 6.7 08/06/2020    LABALBU 3.9 08/06/2020    BILITOT 0.28 08/06/2020    ALKPHOS 68 08/06/2020    AST 27 08/06/2020    ALT 19 08/06/2020     Lab Results   Component Value Date    WBC 4.9 08/06/2020    RBC 5.11 08/06/2020    HGB 14.5 08/06/2020    HCT 46.8 08/06/2020    MCV 91.6 08/06/2020    MCH 28.4 08/06/2020    MCHC 31.0 08/06/2020    RDW 13.6 08/06/2020     08/06/2020    MPV 12.1 08/06/2020     No results found for: TSH  Lab Results   Component Value Date    HDL 74 08/06/2020    LABA1C 5.8 08/06/2020       Assessment/Plan:      Diagnosis Orders   1. Abdominal bloating  Basic Metabolic Panel    CT ABDOMEN PELVIS W IV CONTRAST Additional Contrast? Oral     - Patient's abdominal distension worsening. No clear etiologies based on history.    - Pap smear scheduled 11/23/20.   - UA 11/10/20 showed moderate blood, otherwise normal.   - Colonoscopy 10/30/20 with findings of sigmoid colon tubular adenoma. - Patient was ordered abdomen pelvis CT on 11/10/20 but was unable to perform due to insurance issues. Patient notes concerns with worsening symptoms and being unable to perform CT for 1 week d/t facility scheduling.   - Due to worsening symptoms and abdominal distension on exam, will order stat CT for patient to perform today. Will call with results and adjust treatment plan as necessary. Completed Refills   Requested Prescriptions      No prescriptions requested or ordered in this encounter       Orders Placed This Encounter   Procedures    CT ABDOMEN PELVIS W IV CONTRAST Additional Contrast? Oral     Please complete CT abdomen pelvis without contrast followed by CT abdomen pelvis with contrast     Standing Status:   Future     Number of Occurrences:   1     Standing Expiration Date:   11/18/2021     Order Specific Question:   Additional Contrast?     Answer:   Oral     Order Specific Question:   Reason for exam:     Answer:   abdominal bloating    Basic Metabolic Panel     Standing Status:   Future     Number of Occurrences:   1     Standing Expiration Date:   11/18/2021        No results found for this visit on 11/18/20. Return if symptoms worsen or fail to improve.     Electronically signed by ANIVAL Flores CNP on 11/18/20 at 11:42 AM.         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

## 2020-11-18 NOTE — PATIENT INSTRUCTIONS
SURVEY:    You may be receiving a survey from [employer] regarding your visit today. Please complete the survey to enable us to provide the highest quality of care to you and your family. If you are unable to score a \"very good' on any question, please call the office to discuss how we can improve your experience. We appreciate your feedback. Thank you!

## 2020-11-20 ENCOUNTER — TELEPHONE (OUTPATIENT)
Dept: FAMILY MEDICINE CLINIC | Age: 55
End: 2020-11-20

## 2020-11-20 NOTE — TELEPHONE ENCOUNTER
Pt.'s  called requesting a referral to [facility] Dr. Jane Preciado For new cancer diagnosis as they are having difficulty getting scheduled promptly with [facility]. VO per terrance Aldrich.  Referral made

## 2020-11-23 ENCOUNTER — HOSPITAL ENCOUNTER (OUTPATIENT)
Age: 55
Discharge: HOME | End: 2020-11-23
Payer: COMMERCIAL

## 2020-11-23 ENCOUNTER — OFFICE VISIT (OUTPATIENT)
Dept: OBGYN | Age: 55
End: 2020-11-23
Payer: COMMERCIAL

## 2020-11-23 VITALS
DIASTOLIC BLOOD PRESSURE: 80 MMHG | WEIGHT: 153 LBS | SYSTOLIC BLOOD PRESSURE: 138 MMHG | BODY MASS INDEX: 27.11 KG/M2 | HEIGHT: 63 IN

## 2020-11-23 PROCEDURE — 82378 CARCINOEMBRYONIC ANTIGEN: CPT

## 2020-11-23 PROCEDURE — G8419 CALC BMI OUT NRM PARAM NOF/U: HCPCS | Performed by: ADVANCED PRACTICE MIDWIFE

## 2020-11-23 PROCEDURE — 99202 OFFICE O/P NEW SF 15 MIN: CPT | Performed by: ADVANCED PRACTICE MIDWIFE

## 2020-11-23 PROCEDURE — G8427 DOCREV CUR MEDS BY ELIG CLIN: HCPCS | Performed by: ADVANCED PRACTICE MIDWIFE

## 2020-11-23 PROCEDURE — 36415 COLL VENOUS BLD VENIPUNCTURE: CPT

## 2020-11-23 PROCEDURE — 3017F COLORECTAL CA SCREEN DOC REV: CPT | Performed by: ADVANCED PRACTICE MIDWIFE

## 2020-11-23 PROCEDURE — G8484 FLU IMMUNIZE NO ADMIN: HCPCS | Performed by: ADVANCED PRACTICE MIDWIFE

## 2020-11-23 PROCEDURE — 1036F TOBACCO NON-USER: CPT | Performed by: ADVANCED PRACTICE MIDWIFE

## 2020-11-23 PROCEDURE — 86304 IMMUNOASSAY TUMOR CA 125: CPT

## 2020-11-23 NOTE — PROGRESS NOTES
PROBLEM VISIT     Date of service: 2020    Vinh Moore  Is a 54 y.o.  female    PT's PCP is: Kyree Moran MD     : 1965                                             Subjective:       No LMP recorded. Patient is postmenopausal.     OB History    Para Term  AB Living   1 1 1     1   SAB TAB Ectopic Molar Multiple Live Births             1      # Outcome Date GA Lbr Jean-Paul/2nd Weight Sex Delivery Anes PTL Lv   1 Term 93    F CS-LTranv EPI N BRADLEY        Social History     Tobacco Use   Smoking Status Never Smoker   Smokeless Tobacco Never Used        Social History     Substance and Sexual Activity   Alcohol Use Yes    Comment: Occasionally, on the weekends        Social History     Substance and Sexual Activity   Sexual Activity Yes    Partners: Male       Allergies: Patient has no known allergies. Chief Complaint   Patient presents with    Swelling     New patient. pt c/o ower abdominal swelling, pt denies any irritation or pain. Dr Annelise Oneal ordered a ct of the abdomen and pelvis on 20. pt states when she eats she has swelling on her ovaries        Last Yearly:  Pt unsure     Last pap: pt unsure. Dr Pan Romero     Last HPV: never    PE:  Vital Signs  Blood pressure 138/80, height 5' 3\" (1.6 m), weight 153 lb (69.4 kg). Estimated body mass index is 27.1 kg/m² as calculated from the following:    Height as of this encounter: 5' 3\" (1.6 m). Weight as of this encounter: 153 lb (69.4 kg). NURSE: JEFFERY    HPI: Patient here today stating that over the past month she is in increase in abdominal bloating slowed constipation however she is not nauseous no fatigue no appetite suppression. Patient states she did get CT results from her PCP. Patient states that they were supposed to do a referral to the [facility] and to the [facility]. Patient states she has heard nothing from the [facility] and has an appointment with the [facility] this Friday.     Yes PT denies fever, chills, nausea and vomiting       Objective: Abdomen significantly distended difficult for palpation enlargement in lower abdomen noted    Results reviewed today:    Results for Daniele Polanco (MRN [id number]) as of 11/23/2020 12:54   Ref. Range 11/10/2020 16:16 11/18/2020 11:33   Sodium Latest Ref Range: 135 - 144 mmol/L  141   Potassium Latest Ref Range: 3.7 - 5.3 mmol/L  4.5   Chloride Latest Ref Range: 98 - 107 mmol/L  105   CO2 Latest Ref Range: 20 - 31 mmol/L  27   BUN Latest Ref Range: 6 - 20 mg/dL  12   Creatinine Latest Ref Range: 0.50 - 0.90 mg/dL  0.75   Bun/Cre Ratio Latest Ref Range: 9 - 20   16   Anion Gap Latest Ref Range: 9 - 17 mmol/L  9   GFR Non- Latest Ref Range: >60 mL/min  >60   GFR African American Latest Ref Range: >60 mL/min  >60   Glucose Latest Ref Range: 70 - 99 mg/dL  101 (H)   Calcium Latest Ref Range: 8.6 - 10.4 mg/dL  8.8   GFR Comment Unknown  Pend   GFR Staging Unknown  Pend   Protein, UA Unknown neg    Color, UA Unknown yelllow    Clarity, UA Unknown clear    Glucose, UA POC Unknown neg    Bilirubin, UA Unknown neg    Ketones, UA Unknown neg    Spec Grav, UA Unknown 1.025    pH, UA Unknown 5    Urobilinogen, UA Unknown neg    Nitrite, UA Unknown neg    Leukocytes, UA Unknown neg    Blood, UA POC Unknown moderate       11/18/2020  2:04 PM  Ti, [facility] Incoming Radiant Results From [address] [place] [address]  Results      11/18/2020  2:04 PM  Ti, [facility] Incoming Radiant Results From Clare Mendosa MD  CC'd Results    Radiation Dose Estimates     No radiation information found for this patient    Narrative    EXAMINATION:    CT OF THE ABDOMEN AND PELVIS WITH CONTRAST 11/18/2020 1:44 pm         TECHNIQUE:    CT of the abdomen and pelvis was performed with the administration of    intravenous contrast. Multiplanar reformatted images are provided for review.     Dose modulation, iterative reconstruction, and/or weight 3.  Probably benign subcentimeter hepatic cysts.         Findings were discussed with Ekaterina Singh at 1:57 pm on 11/18/2020.                                     Assessment and Plan          Diagnosis Orders   1. Cysts of both ovaries  CEA       2. Malignant ascites     3. Abnormal CT scan         We did set up my chart for patient to view her lab results and show her provider    I also gave her my business card as they can CC results to my office      Jeana Carey. Leann Moya does not currently have medications on file. Return for Keep appointment with [facility] on Friday. Will call patient with results. She was also counseled on her preventative health maintenance recommendations and follow-up. There are no Patient Instructions on file for this visit.     Mauri Biggs,11/23/2020 12:56 PM

## 2020-11-24 LAB
CA 125: 664 U/ML
CARCINOEMBRYONIC ANTIGEN: 0.3 NG/ML

## 2021-07-13 ENCOUNTER — HOSPITAL ENCOUNTER (OUTPATIENT)
Dept: WOMENS IMAGING | Age: 56
Discharge: HOME | End: 2021-07-15
Payer: COMMERCIAL

## 2021-07-13 DIAGNOSIS — Z12.31 BREAST CANCER SCREENING BY MAMMOGRAM: ICD-10-CM

## 2021-07-13 PROCEDURE — 77063 BREAST TOMOSYNTHESIS BI: CPT

## 2021-12-14 ENCOUNTER — TELEPHONE (OUTPATIENT)
Dept: FAMILY MEDICINE CLINIC | Age: 56
End: 2021-12-14

## 2021-12-14 DIAGNOSIS — U07.1 ACUTE COVID-19: ICD-10-CM

## 2021-12-14 RX ORDER — ALBUTEROL SULFATE 90 UG/1
4 AEROSOL, METERED RESPIRATORY (INHALATION) PRN
OUTPATIENT
Start: 2021-12-14

## 2021-12-14 RX ORDER — SODIUM CHLORIDE 9 MG/ML
INJECTION, SOLUTION INTRAVENOUS CONTINUOUS
OUTPATIENT
Start: 2021-12-14

## 2021-12-14 RX ORDER — SODIUM CHLORIDE 0.9 % (FLUSH) 0.9 %
5-40 SYRINGE (ML) INJECTION PRN
Status: CANCELLED | OUTPATIENT
Start: 2021-12-14

## 2021-12-14 RX ORDER — SODIUM CHLORIDE 9 MG/ML
25 INJECTION, SOLUTION INTRAVENOUS PRN
OUTPATIENT
Start: 2021-12-14

## 2021-12-14 RX ORDER — DIPHENHYDRAMINE HYDROCHLORIDE 50 MG/ML
50 INJECTION INTRAMUSCULAR; INTRAVENOUS
OUTPATIENT
Start: 2021-12-14

## 2021-12-14 RX ORDER — ONDANSETRON 2 MG/ML
8 INJECTION INTRAMUSCULAR; INTRAVENOUS
OUTPATIENT
Start: 2021-12-14

## 2021-12-14 RX ORDER — ACETAMINOPHEN 325 MG/1
650 TABLET ORAL
OUTPATIENT
Start: 2021-12-14

## 2021-12-14 RX ORDER — EPINEPHRINE 1 MG/ML
0.3 INJECTION, SOLUTION, CONCENTRATE INTRAVENOUS PRN
OUTPATIENT
Start: 2021-12-14

## 2021-12-14 RX ORDER — HEPARIN SODIUM (PORCINE) LOCK FLUSH IV SOLN 100 UNIT/ML 100 UNIT/ML
500 SOLUTION INTRAVENOUS PRN
OUTPATIENT
Start: 2021-12-14

## 2021-12-14 RX ORDER — SODIUM CHLORIDE 9 MG/ML
INJECTION, SOLUTION INTRAVENOUS CONTINUOUS
Status: CANCELLED | OUTPATIENT
Start: 2021-12-14

## 2021-12-14 NOTE — TELEPHONE ENCOUNTER
Patient has Cough congestion, tested positive 12/12 sx started 12/10, she now just has some residual congestion. Denies fever, chest pain, SOB. VO per Dr. Aaliyah Pedro, given history of cancer and current chemo treatment, set up order for monoclonal antibodies. Order placed for BAM-Nicolas antibodies and sent to [facility] dept. Pt. Hedy Manual.

## 2021-12-15 ENCOUNTER — HOSPITAL ENCOUNTER (OUTPATIENT)
Dept: INFUSION THERAPY | Age: 56
Discharge: HOME | End: 2021-12-15
Payer: COMMERCIAL

## 2021-12-15 VITALS
HEART RATE: 73 BPM | RESPIRATION RATE: 20 BRPM | TEMPERATURE: 98 F | SYSTOLIC BLOOD PRESSURE: 120 MMHG | OXYGEN SATURATION: 99 % | DIASTOLIC BLOOD PRESSURE: 79 MMHG

## 2021-12-15 DIAGNOSIS — U07.1 ACUTE COVID-19: Primary | ICD-10-CM

## 2021-12-15 PROCEDURE — M0245 HC IV INFUSION BAMLANIVIMAB & ETESEVIMAB W/MONITORING: HCPCS

## 2021-12-15 PROCEDURE — 2580000003 HC RX 258: Performed by: FAMILY MEDICINE

## 2021-12-15 PROCEDURE — 2500000003 HC RX 250 WO HCPCS: Performed by: FAMILY MEDICINE

## 2021-12-15 PROCEDURE — 6360000002 HC RX W HCPCS: Performed by: FAMILY MEDICINE

## 2021-12-15 RX ORDER — SODIUM CHLORIDE 9 MG/ML
INJECTION, SOLUTION INTRAVENOUS CONTINUOUS
Status: CANCELLED | OUTPATIENT
Start: 2021-12-15

## 2021-12-15 RX ORDER — SODIUM CHLORIDE 0.9 % (FLUSH) 0.9 %
5-40 SYRINGE (ML) INJECTION PRN
Status: CANCELLED | OUTPATIENT
Start: 2021-12-15

## 2021-12-15 RX ORDER — SODIUM CHLORIDE 9 MG/ML
INJECTION, SOLUTION INTRAVENOUS CONTINUOUS
OUTPATIENT
Start: 2021-12-15

## 2021-12-15 RX ORDER — DIPHENHYDRAMINE HYDROCHLORIDE 50 MG/ML
50 INJECTION INTRAMUSCULAR; INTRAVENOUS
OUTPATIENT
Start: 2021-12-15

## 2021-12-15 RX ORDER — SODIUM CHLORIDE 9 MG/ML
25 INJECTION, SOLUTION INTRAVENOUS PRN
OUTPATIENT
Start: 2021-12-15

## 2021-12-15 RX ORDER — ACETAMINOPHEN 325 MG/1
650 TABLET ORAL
OUTPATIENT
Start: 2021-12-15

## 2021-12-15 RX ORDER — M-VIT,TX,IRON,MINS/CALC/FOLIC 27MG-0.4MG
1 TABLET ORAL DAILY
COMMUNITY

## 2021-12-15 RX ORDER — LISINOPRIL 10 MG/1
10 TABLET ORAL DAILY
COMMUNITY
Start: 2021-12-07

## 2021-12-15 RX ORDER — ALBUTEROL SULFATE 90 UG/1
4 AEROSOL, METERED RESPIRATORY (INHALATION) PRN
OUTPATIENT
Start: 2021-12-15

## 2021-12-15 RX ORDER — ONDANSETRON 2 MG/ML
8 INJECTION INTRAMUSCULAR; INTRAVENOUS
OUTPATIENT
Start: 2021-12-15

## 2021-12-15 RX ORDER — EPINEPHRINE 1 MG/ML
0.3 INJECTION, SOLUTION, CONCENTRATE INTRAVENOUS PRN
OUTPATIENT
Start: 2021-12-15

## 2021-12-15 RX ORDER — SODIUM CHLORIDE 9 MG/ML
INJECTION, SOLUTION INTRAVENOUS CONTINUOUS
Status: DISCONTINUED | OUTPATIENT
Start: 2021-12-15 | End: 2021-12-16 | Stop reason: HOSPADM

## 2021-12-15 RX ORDER — SODIUM CHLORIDE 0.9 % (FLUSH) 0.9 %
5-40 SYRINGE (ML) INJECTION PRN
Status: DISCONTINUED | OUTPATIENT
Start: 2021-12-15 | End: 2021-12-16 | Stop reason: HOSPADM

## 2021-12-15 RX ORDER — HEPARIN SODIUM (PORCINE) LOCK FLUSH IV SOLN 100 UNIT/ML 100 UNIT/ML
500 SOLUTION INTRAVENOUS PRN
OUTPATIENT
Start: 2021-12-15

## 2021-12-15 RX ADMIN — SODIUM CHLORIDE, PRESERVATIVE FREE 10 ML: 5 INJECTION INTRAVENOUS at 10:22

## 2021-12-15 RX ADMIN — SODIUM CHLORIDE: 9 INJECTION, SOLUTION INTRAVENOUS at 10:49

## 2021-12-15 RX ADMIN — SODIUM CHLORIDE: 9 INJECTION, SOLUTION INTRAVENOUS at 10:28

## 2021-12-15 NOTE — PROGRESS NOTES
Reviewed FDA EUA Fact sheet and After Infusion Information sheet for discharge. Written copies provided to patient. Verbalized understanding. Encouraged PO fluids and rest. DC home with self. Bamlanivimab      You have received an infusion of Bamlanivimab authorized for emergency use by the FDA. You should continue to self-isolate and use infection control measures(wear mask,isolate,social distance,avoid sharing personal items,clean and disinfect\"high touch\"   surfaces,and frequent handwashing) according to CDC guidelines. Report all changes in your health to your doctor as soon as possible. Symptoms to report  to your physician immediately or seek medical care:          Fever,chills,shaking,flushing      Hives,rash,itching,swelling of lips and mouth.        Shortness of breath,difficulty breathing,wheezing     Chest pain,tightness     Stomach pain     Low blood pressure/oxygen levels     Nausea     Dizziness,fainting     Weakness,numbness,tingling in any body parts     Headache       Fatigue,muscle pain/joint aching

## 2022-04-21 ENCOUNTER — TELEPHONE (OUTPATIENT)
Dept: FAMILY MEDICINE CLINIC | Age: 57
End: 2022-04-21

## 2022-04-21 ASSESSMENT — PATIENT HEALTH QUESTIONNAIRE - PHQ9
1. LITTLE INTEREST OR PLEASURE IN DOING THINGS: 0
SUM OF ALL RESPONSES TO PHQ9 QUESTIONS 1 & 2: 0
2. FEELING DOWN, DEPRESSED OR HOPELESS: 0
SUM OF ALL RESPONSES TO PHQ QUESTIONS 1-9: 0

## 2022-09-30 ENCOUNTER — HOSPITAL ENCOUNTER (OUTPATIENT)
Dept: WOMENS IMAGING | Age: 57
Discharge: HOME | End: 2022-10-02
Payer: COMMERCIAL

## 2022-09-30 DIAGNOSIS — Z12.39 SCREENING BREAST EXAMINATION: ICD-10-CM

## 2022-09-30 PROCEDURE — 77063 BREAST TOMOSYNTHESIS BI: CPT

## 2024-01-01 ENCOUNTER — HOSPITAL ENCOUNTER (EMERGENCY)
Age: 59
End: 2024-07-26
Attending: EMERGENCY MEDICINE
Payer: COMMERCIAL

## 2024-01-01 VITALS
OXYGEN SATURATION: 87 % | DIASTOLIC BLOOD PRESSURE: 48 MMHG | RESPIRATION RATE: 12 BRPM | BODY MASS INDEX: 26.12 KG/M2 | HEART RATE: 55 BPM | WEIGHT: 153 LBS | HEIGHT: 64 IN | SYSTOLIC BLOOD PRESSURE: 112 MMHG

## 2024-01-01 DIAGNOSIS — I46.9 CARDIAC ARREST (HCC): Primary | ICD-10-CM

## 2024-01-01 LAB
EKG Q-T INTERVAL: 424 MS
EKG QRS DURATION: 110 MS
EKG QTC CALCULATION (BAZETT): 457 MS
EKG R AXIS: 67 DEGREES
EKG T AXIS: 12 DEGREES
EKG VENTRICULAR RATE: 70 BPM

## 2024-01-01 PROCEDURE — 6360000002 HC RX W HCPCS: Performed by: EMERGENCY MEDICINE

## 2024-01-01 PROCEDURE — 93010 ELECTROCARDIOGRAM REPORT: CPT | Performed by: FAMILY MEDICINE

## 2024-01-01 PROCEDURE — 96374 THER/PROPH/DIAG INJ IV PUSH: CPT

## 2024-01-01 PROCEDURE — 2500000003 HC RX 250 WO HCPCS: Performed by: EMERGENCY MEDICINE

## 2024-01-01 PROCEDURE — 99285 EMERGENCY DEPT VISIT HI MDM: CPT

## 2024-01-01 PROCEDURE — 93005 ELECTROCARDIOGRAM TRACING: CPT | Performed by: EMERGENCY MEDICINE

## 2024-01-01 PROCEDURE — 92950 HEART/LUNG RESUSCITATION CPR: CPT

## 2024-01-01 RX ORDER — DEXTROSE MONOHYDRATE 25 G/50ML
INJECTION, SOLUTION INTRAVENOUS DAILY PRN
Status: COMPLETED | OUTPATIENT
Start: 2024-01-01 | End: 2024-01-01

## 2024-01-01 RX ORDER — NOREPINEPHRINE BITARTRATE 0.06 MG/ML
INJECTION, SOLUTION INTRAVENOUS CONTINUOUS PRN
Status: COMPLETED | OUTPATIENT
Start: 2024-01-01 | End: 2024-01-01

## 2024-01-01 RX ORDER — EPINEPHRINE IN SOD CHLOR,ISO 1 MG/10 ML
SYRINGE (ML) INTRAVENOUS DAILY PRN
Status: COMPLETED | OUTPATIENT
Start: 2024-01-01 | End: 2024-01-01

## 2024-01-01 RX ADMIN — EPINEPHRINE 1 MG: 0.1 INJECTION INTRACARDIAC; INTRAVENOUS at 09:39

## 2024-01-01 RX ADMIN — EPINEPHRINE 1 MG: 0.1 INJECTION INTRACARDIAC; INTRAVENOUS at 09:47

## 2024-01-01 RX ADMIN — SODIUM BICARBONATE 50 MEQ: 84 INJECTION, SOLUTION INTRAVENOUS at 10:10

## 2024-01-01 RX ADMIN — Medication 30 MCG/MIN: at 09:46

## 2024-01-01 RX ADMIN — DEXTROSE MONOHYDRATE 25 G: 25 INJECTION, SOLUTION INTRAVENOUS at 09:44

## 2024-01-01 RX ADMIN — EPINEPHRINE 1 MG: 0.1 INJECTION INTRACARDIAC; INTRAVENOUS at 09:53

## 2024-01-01 RX ADMIN — EPINEPHRINE 1 MG: 0.1 INJECTION INTRACARDIAC; INTRAVENOUS at 09:55

## 2024-07-20 ENCOUNTER — HOSPITAL ENCOUNTER (OUTPATIENT)
Age: 59
Setting detail: SPECIMEN
Discharge: HOME | End: 2024-07-20
Payer: COMMERCIAL

## 2024-07-20 LAB
C DIFF GDH + TOXINS A+B STL QL IA.RAPID: NEGATIVE
LACTOFERRIN STL QL: NORMAL
SPECIMEN DESCRIPTION: NORMAL

## 2024-07-20 PROCEDURE — 87210 SMEAR WET MOUNT SALINE/INK: CPT

## 2024-07-20 PROCEDURE — 87209 SMEAR COMPLEX STAIN: CPT

## 2024-07-20 PROCEDURE — 87015 SPECIMEN INFECT AGNT CONCNTJ: CPT

## 2024-07-21 LAB
CAMPYLOBACTER DNA SPEC NAA+PROBE: NORMAL
ETEC ELTA+ESTB GENES STL QL NAA+PROBE: NORMAL
P SHIGELLOIDES DNA STL QL NAA+PROBE: NORMAL
SALMONELLA DNA SPEC QL NAA+PROBE: NORMAL
SHIGA TOXIN STX GENE SPEC NAA+PROBE: NORMAL
SHIGELLA DNA SPEC QL NAA+PROBE: NORMAL
SPECIMEN DESCRIPTION: NORMAL
V CHOL+PARA RFBL+TRKH+TNAA STL QL NAA+PR: NORMAL
Y ENTERO RECN STL QL NAA+PROBE: NORMAL

## 2024-07-22 LAB
O+P STL CONC: NORMAL
SPECIMEN DESCRIPTION: NORMAL

## 2024-07-26 NOTE — PROGRESS NOTES
1233-Patient taken to [facility] at this time.   1245-Spoke with [facility] and they informed that they would be reaching out to family within the next half hour to discuss cornea donation.

## 2024-07-26 NOTE — ED NOTES
End tidal 32   Zyclara Counseling:  I discussed with the patient the risks of imiquimod including but not limited to erythema, scaling, itching, weeping, crusting, and pain.  Patient understands that the inflammatory response to imiquimod is variable from person to person and was educated regarded proper titration schedule.  If flu-like symptoms develop, patient knows to discontinue the medication and contact us.

## 2024-07-26 NOTE — PROGRESS NOTES
Spiritual Services Interventions  01A/01A 7/26/2024  Edwar Townsend  59 y.o. year old female    Encounter Summary  Encounter Overview/Reason: Crisis  Service Provided For: Family  Support System: Spouse, Children  Last Encounter : 07/26/24  Complexity of Encounter: High  Begin Time: (P) 0930  End Time : (P) 1145  Total Time Calculated: (P) 135 min  Crisis  Type: (P) Code Blue  Spiritual/Emotional needs  Type: (P) Spiritual Support  Rituals, Rites and Sacraments  Type: (P) Anointing  Grief, Loss, and Adjustments  Type: (P) Death              Assessment/Intervention/Outcome  Assessment: (P) Tearful, Despair  Intervention: (P) Prayer (assurance of)/[place], Active listening  Outcome: (P) Engaged in conversation, Comfort, Acceptance

## 2024-07-26 NOTE — ED PROVIDER NOTES
Never         PHYSICAL EXAM       ED Triage Vitals   BP Temp Temp src Pulse Respirations SpO2 Height Weight - Scale   07/26/24 0948 -- -- 07/26/24 0951 07/26/24 0951 07/26/24 0952 -- 07/26/24 0955   (!) 242/101   75 18 93 %  69.4 kg (153 lb)       Physical Exam  Constitutional:       Comments: Cardiac arrest with Navi device performing CPR.  ET tube in place on arrival.   HENT:      Head: Normocephalic and atraumatic.   Eyes:      Comments: Pupils fixed/dilated.   Neck:      Vascular: No JVD.   Cardiovascular:      Comments: Navi device attached and performing CPR on arrival.  No pulse noted.  Pulmonary:      Comments: Breath sounds evaluated during pause and CPR.  Equal bilaterally.  Abdominal:      Comments: Soft.  Distended.  No bruising or other injury noted.  Ex lap scar noted.   Skin:     General: Skin is cool.      Coloration: Skin is mottled. Skin is not jaundiced.   Neurological:      GCS: GCS eye subscore is 1. GCS verbal subscore is 1. GCS motor subscore is 1.         DIAGNOSTIC RESULTS     EKG: Sinus at 70 bpm.  QTc 455.  No STEMI.      EMERGENCY DEPARTMENT COURSE and DIFFERENTIAL DIAGNOSIS/MDM:     59-year-old female with history of ovarian cancer presents to the ED in cardiac arrest.  On arrival, ET tube appears to be in place based on waveform capnography viewed on the EMS monitor.  CPR in progress with Navi device.    Please see nursing documentation for CPR/medication flowsheet.    Throughout the management the patient was in PEA.  For a short period of time we did obtain ROSC, though the patient did not appear to have a shockable rhythm at any point.    Following discussion with the  at the bedside the decision was made to discontinue resuscitative efforts as the patient remained pulseless on our final reevaluation.  No cardiac activity was noted on ultrasound performed at that time.  Time of death called at 0957.      1020 - Jennifer () has released body.      CRITICAL CARE TIME      Total Critical Care time was 5 minutes, excluding separately reportable procedures (CPR).  There was a high probability of clinically significant/life threatening deterioration in the patient's condition which required my urgent intervention.        FINAL IMPRESSION      1. Cardiac arrest (HCC)          DISPOSITION/PLAN     DISPOSITION  2024 10:15:02 AM      (Please note that portions of this note were completed with a voice recognition program.  Efforts were made to edit the dictations but occasionally words are mis-transcribed.)    Austin Mata MD (electronically signed)  Attending Emergency Physician            Austin Mata MD  [phone]

## 2024-07-26 NOTE — PLAN OF CARE
Informed by Pastoral care Spouse called [facility] for  services. Writer then called [facility] and Informed [facility] is following for cornea donation.

## 2024-07-29 LAB
EKG Q-T INTERVAL: 424 MS
EKG QRS DURATION: 110 MS
EKG QTC CALCULATION (BAZETT): 457 MS
EKG R AXIS: 67 DEGREES
EKG T AXIS: 12 DEGREES
EKG VENTRICULAR RATE: 70 BPM

## (undated) DEVICE — MEDI-VAC NON-CONDUCTIVE TUBING7MM X 30.5 (100FT): Brand: CARDINAL HEALTH

## (undated) DEVICE — ACUSNARE POLYPECTOMY SNARE: Brand: ACUSNARE

## (undated) DEVICE — POLYP TRAP: Brand: TRAPEASE®

## (undated) DEVICE — CANNULA ORAL NSL AD CO2 N INTUB O2 DEL DISP TRU LNK

## (undated) DEVICE — SOLUTION IV IRRIG POUR BRL 0.9% SODIUM CHL 2F7124